# Patient Record
(demographics unavailable — no encounter records)

---

## 2025-01-07 NOTE — ASSESSMENT
[FreeTextEntry1] : Echocardiogram February 17, 2020 demonstrates left ventricle normal in size and function with ejection fraction of 55-60%. Mild mitral regurgitation with no other significant structural abnormalities.  Holter monitor performed November 15, 2018 following the findings of his exercise stress test demonstrated a presenting rhythm of sinus with an average heart rate of 80 beats per minute, maximum 120, minimum 54 beats a minute. There were occasional PACs and PVCs but no other significant arrhythmia.  Nuclear stress test January 25, 2021 was a pharmacologic study which was tolerated well. Blood pressure response was normal. EKG showed horizontal ST depressions in the inferior leads with infusion. 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 Ejection fraction of 60%.  Echocardiogram October 31, 2022 demonstrated left ventricle normal size and function with ejection fraction of 55 to 60%. No significant valvular or structural abnormality noted.  Carotid Doppler November 5, 2022 demonstrated mild plaque with no significant stenosis bilaterally. Incidentally noted 1.1 cm right thyroid nodule.  Nuclear stress test May 1, 2023 during which the patient exercised via a Ivan protocol for 7 minutes and 30 seconds, totaling 8 METS. Peak heart rate achieved was 153 beats minute, representing 100% of age 50 maximum. Blood pressure response to exercise was normal. EKG showed horizontal ST depressions in the anterolateral leads with exercise. However of more importance were the nuclear images which showed a moderate-sized basal to mid inferolateral infarct with mild gem-infarct ischemia. Ejection fraction of 67%. No change from prior study in 2020.  Lower extremity arterial Doppler March 28, 2023 showed no plaque and was normal bilaterally.  Nuclear stress test August 16, 2024 during which the patient exercised via a Ivan protocol for 10 minutes and 51 seconds, totaling 12 METS.  Peak heart rate achieved was 153 bpm, representing 101% of age-predicted maximum.  Blood pressure response to exercise was normal.  EKG showed horizontal ST depressions in the inferior and anterolateral leads with exercise.  Evaluation of nuclear imaging showed a moderate-sized inferoposterior infarct with minimal gem-infarct ischemia.  Ejection fraction of 64%.  Echocardiogram July 16, 2024 demonstrated left atrial normal size and function with ejection fraction of 55 to 60%.  Mild mitral regurgitation.  EKG: Sinus rhythm with no significant ST or T wave changes.  Ventricular couplet.  Late R-wave transition.  69-year-old man with a past medical history of CAD s/p CABG, prior mild ICM, PAF/AFL, obstructive sleep apnea untreated, family history of CAD who presents for followup.  Patient is feeling better with regards to his chest discomforts which have all resolved.  He appears very stable from a cardiac standpoint.  No evidence of ischemia or heart for at this time.  EKG is essentially unchanged.  The erectile dysfunction issues certainly could be related to his known vascular disease.  I have encouraged him to follow-up with urology.  No contraindication to PDE 5 inhibitors but I have instructed him to avoid mixing them with nitrates.  With regards to the numbness in his left arm it appears to be likely radiculopathy or related to possibly nerve compression in his elbow.  He will follow-up with his primary or neurology.

## 2025-01-07 NOTE — DISCUSSION/SUMMARY
[FreeTextEntry1] : 1.  No additional cardiac testing at this time. 2.  Continue current cardiac meds in doses as noted above for CAD, cardiomyopathy, atrial arrhythmia including atorvastatin 80 mg daily. 3.  Continue Eliquis 5 mg b.i.d. for stroke prophylaxis for atrial fibrillation.   4.  Monitor BP at home, keep a log and bring to f/u.  5.  Encourage adequate hydration.  6.  Patient is encouraged to exercise at least 30 minutes a day everyday of the week. 7.  Follow-up with his primary and/or neurology for the issues of numbness in his arm.  Follow-up with urology for his erectile dysfunction.  No PDE 5 inhibitor contraindication. 8.  Follow up here in four months. [EKG obtained to assist in diagnosis and management of assessed problem(s)] : EKG obtained to assist in diagnosis and management of assessed problem(s)

## 2025-01-07 NOTE — HISTORY OF PRESENT ILLNESS
[FreeTextEntry1] : Patient presents back to the office today feeling generally well but with a couple of complaints.  He reports that he wakes up in the mornings frequently with numbness and tingling in his left medial posterior forearm and hand.  This gets better as he gets up and moves around and then does not return until the next morning.  He also reports recent issues with erectile dysfunction.  He says he is able to get an erection but is unable to keep it.  He reports that the chest pain he was having is all resolved.  He never followed up for endoscopy because he says he was lazy.  He does not report any other new symptoms.  Patient denies shortness of breath, palpitations, orthopnea, presyncope, syncope.

## 2025-01-07 NOTE — PHYSICAL EXAM
[General Appearance - In No Acute Distress] : no acute distress [Normal Conjunctiva] : the conjunctiva exhibited no abnormalities [Normal Oral Mucosa] : normal oral mucosa [Auscultation Breath Sounds / Voice Sounds] : lungs were clear to auscultation bilaterally [Normal Rate] : normal [Rhythm Regular] : regular [Normal S1] : normal S1 [Normal S2] : normal S2 [S4] : an S4 was heard [II] : a grade 2 [Abdomen Soft] : soft [Abdomen Tenderness] : non-tender [Abnormal Walk] : normal gait [Nail Clubbing] : no clubbing of the fingernails [Skin Color & Pigmentation] : normal skin color and pigmentation [Oriented To Time, Place, And Person] : oriented to person, place, and time [Affect] : the affect was normal [S3] : no S3 [FreeTextEntry1] : No edema

## 2025-01-07 NOTE — REASON FOR VISIT
[FreeTextEntry1] : Patient comes for follow up status post CABG with atrial fibrillation and atrial flutter

## 2025-07-22 NOTE — DISCUSSION/SUMMARY
[FreeTextEntry1] : 1.  Check carotid Doppler to reevaluate known carotid disease. 2.  Continue current cardiac meds in doses as noted above for CAD, cardiomyopathy, atrial arrhythmia including atorvastatin 80 mg daily. 3.  Continue Eliquis 5 mg b.i.d. for stroke prophylaxis for atrial fibrillation.   4.  Monitor BP at home, keep a log and bring to f/u.  5.  Encourage adequate hydration.  6.  Patient is encouraged to exercise at least 30 minutes a day everyday of the week. 7.  He will have blood work done. 7.  Follow up here in four months. [EKG obtained to assist in diagnosis and management of assessed problem(s)] : EKG obtained to assist in diagnosis and management of assessed problem(s)

## 2025-07-22 NOTE — HISTORY OF PRESENT ILLNESS
[FreeTextEntry1] : Patient presents back to the office today once again having some issues with chest pressure at times.  He says it is similar to what he has had in the past and ever since his surgery.  It still last for hours at a time and seems to occur at random.  No exertional component and no other associated symptoms.  He does not report any other physical symptoms at this time.  He followed up with urology and tried medication for erectile dysfunction but it caused him some palpitations and he never took it again or follow back up.  He still has issues with erectile dysfunction but does not want to consider any further treatment.  Patient denies shortness of breath, palpitations, orthopnea, presyncope, syncope.